# Patient Record
Sex: MALE | Race: BLACK OR AFRICAN AMERICAN | NOT HISPANIC OR LATINO
[De-identification: names, ages, dates, MRNs, and addresses within clinical notes are randomized per-mention and may not be internally consistent; named-entity substitution may affect disease eponyms.]

---
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
## 2019-09-10 ENCOUNTER — EMERGENCY (EMERGENCY)
Facility: HOSPITAL | Age: 19
LOS: 0 days | Discharge: HOME | End: 2019-09-10
Attending: EMERGENCY MEDICINE | Admitting: EMERGENCY MEDICINE
Payer: MEDICARE

## 2019-09-10 VITALS
SYSTOLIC BLOOD PRESSURE: 127 MMHG | DIASTOLIC BLOOD PRESSURE: 62 MMHG | RESPIRATION RATE: 18 BRPM | OXYGEN SATURATION: 98 % | TEMPERATURE: 98 F | HEART RATE: 77 BPM

## 2019-09-10 VITALS — TEMPERATURE: 98 F

## 2019-09-10 DIAGNOSIS — S09.90XA UNSPECIFIED INJURY OF HEAD, INITIAL ENCOUNTER: ICD-10-CM

## 2019-09-10 DIAGNOSIS — M54.2 CERVICALGIA: ICD-10-CM
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
## 2019-09-10 DIAGNOSIS — M25.552 PAIN IN LEFT HIP: ICD-10-CM
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
## 2019-09-10 PROCEDURE — 72125 CT NECK SPINE W/O DYE: CPT | Mod: 26

## 2019-09-10 PROCEDURE — 76604 US EXAM CHEST: CPT | Mod: 26

## 2019-09-10 PROCEDURE — 76705 ECHO EXAM OF ABDOMEN: CPT | Mod: 26

## 2019-09-10 PROCEDURE — 93308 TTE F-UP OR LMTD: CPT | Mod: 26

## 2019-09-10 PROCEDURE — 99284 EMERGENCY DEPT VISIT MOD MDM: CPT | Mod: 25

## 2019-09-10 NOTE — ED PEDIATRIC NURSE NOTE - NSIMPLEMENTINTERV_GEN_ALL_ED
Implemented All Universal Safety Interventions:  [place] to call system. Call bell, personal items and telephone within reach. Instruct patient to call for assistance. Room bathroom lighting operational. Non-slip footwear when patient is off stretcher. Physically safe environment: no spills, clutter or unnecessary equipment. Stretcher in lowest position, wheels locked, appropriate side rails in place.

## 2019-09-10 NOTE — ED PROVIDER NOTE - NSFOLLOWUPINSTRUCTIONS_ED_ALL_ED_FT
WHAT YOU NEED TO KNOW:    What do I need to know about neck pain? You may have sudden neck pain that increases quickly. You may instead feel pain build slowly over time. Neck pain may go away in a few days or weeks, or it may continue for months. The pain may come and go, or be worse with certain movements. The pain may be only in your neck, or it may move to your arms, back, or shoulders. You may also have pain that starts in another body area and moves to your neck. Some types of neck pain are permanent.Vertebral Column         What causes or increases my risk for neck pain?     Stenosis (narrowing) of your spinal column, or degeneration (breakdown) or inflammation of the discs in your neck      Inflammation from a condition such as rheumatoid arthritis, polymyalgia rheumatica, or rotator cuff tendinitis      A condition that affects neck to arm nerves, such as thoracic outlet syndrome or brachial neuritis       Trauma or injury to your neck, such as being hit from behind in a car (whiplash) or sleeping in a bad position      A fracture of a neck bone that causes nerve damage      A medical condition, such as shingles, meningitis, a tumor, or coronary artery disease (CAD)     How is the cause of neck pain diagnosed? Your healthcare provider will ask about your symptoms and when they began. Tell him if you were recently in an accident or had another injury to your neck. He will examine your neck and shoulders. He may also have you move your head and arms in certain ways to see if any position causes or relieves the pain.     Blood tests may be used to measure the amount of inflammation or to check for signs of an infection.      X-ray or MRI pictures may show a neck injury or medical condition. Do not enter the MRI room with anything metal. Metal can cause serious damage. Tell the healthcare provider if you have any metal in or on your body.    How is neck pain treated? Treatment will depend on what is causing your pain.    Medicines may be prescribed or recommended by your healthcare provider for pain. You may need medicine to treat nerve pain or to stop muscle spasms. Medicines may also be given to reduce inflammation. Your healthcare provider may inject medicine into a nerve to block pain. Over-the-counter NSAID medicine or acetaminophen may be recommended to help treat minor pain or inflammation.      Traction is used to relieve pressure from nerves. Your head is gently pulled up and away from your neck. This stretches muscles and ligaments and makes more room for the spine. Your healthcare provider will tell you the kind of traction that will help your neck pain. Do not use traction devices at home unless directed by your healthcare provider.      Surgery may be needed if the pain is severe or other treatments do not work. Surgery will not help every kind of neck pain. You may need surgery to stabilize a fractured bone or to remove a tumor. Surgery may also be used to widen a narrowed spinal column or to remove a disc from between neck bones.     What can I do to manage or prevent neck pain?     Rest your neck as directed. Do not make sudden movements, such as turning your head quickly. Your healthcare provider may recommend you wear a cervical collar for a short time. The collar will prevent you from moving your head. This will give your neck time to heal if an injury is causing your neck pain. Ask your healthcare provider when you can return to sports or other normal daily activities.      Apply heat as directed. Heat helps relieve pain and swelling. Use a heat wrap, or soak a small towel in warm water. Wring out the extra water. Apply the heat wrap or towel for 20 minutes every hour, or as directed.      Apply ice as directed. Ice helps relieve pain and swelling, and can help prevent tissue damage. Use an ice pack, or put ice in a bag. Cover the ice pack or back with a towel before you apply it to your neck. Apply the ice pack or ice for 15 minutes every hour, or as directed. Your healthcare provider can tell you how often to apply ice.      Do neck exercises as directed. Neck exercises help strengthen the muscles and increase range of motion. Your healthcare provider will tell you which exercises are right for you. He may give you instructions, or he may recommend that you work with a physical therapist. Your healthcare provider or therapist can make sure you are doing the exercises correctly.       Maintain good posture. Try to keep your head and shoulders lifted when you sit. If you work in front of a computer, make sure the monitor is at the right level. You should not need to look up down to see the screen. You should also not have to lean forward to be able to read what is on the screen. Make sure your keyboard, mouse, and other computer items are placed where you do not have to extend your shoulder to reach them. Get up often if you work in front of a computer or sit for long periods of time. Stretch or walk around to keep your neck muscles loose.    When should I seek immediate care?     You have an injury that causes neck pain and shooting pain down your arms or legs.      Your neck pain suddenly becomes severe.      You have neck pain along with numbness, tingling, or weakness in your arms or legs.      You have a stiff neck, a headache, and a fever.    When should I contact my healthcare provider?     You have new or worsening symptoms.      Your symptoms continue even after treatment.      You have questions or concerns about your condition or care.    CARE AGREEMENT:    You have the right to help plan your care. Learn about your health condition and how it may be treated. Discuss treatment options with your healthcare providers to decide what care you want to receive. You always have the right to refuse treatment.

## 2019-09-10 NOTE — ED PROVIDER NOTE - CLINICAL SUMMARY MEDICAL DECISION MAKING FREE TEXT BOX
Pt s/o head injury wearing helmet w/o HA or neruo sx with neck pain s/p a compression injury. pt evaluated c/w ATLS; CT negative and dc home

## 2019-09-10 NOTE — ED PROCEDURE NOTE - ATTENDING CONTRIBUTION TO CARE
I was present for and supervised the key critical aspects of the procedures performed during the care of the patient.  This ends my involvement in the patients care.
I was present for and supervised the key critical aspects of the procedures performed during the care of the patient.   This ends my involvement in the patients care.

## 2019-09-10 NOTE — ED PROVIDER NOTE - OBJECTIVE STATEMENT
18 yo M with no significant PMHx here after colliding with another football player with his head PTA with neck pain - mild. No neuro sx or deficits. No HA, n/v, dizziness. P

## 2019-09-10 NOTE — ED PROVIDER NOTE - ATTENDING CONTRIBUTION TO CARE
18 yo M with no significant PMHx here after colliding with another football player with his head PTA with neck pain - mild. No neuro sx or deficits. No HA, n/v, dizziness. Pt with mild pain in the L hip as well.   On exam pt is awake and alert, no distress, NCAT, CN intact, + mild line cervical TTP, no step offs, 5/5 strength throughout and no FND, no other spinal tenderness, abdominal/thorax and back exams nml. UEs, RLE  w/o tenderness or deformity and FROM without pain, LLE with mild pain with ranging hip only, otherwise nml.  CT c spine and XR hip and reval 18 yo M with no significant PMHx here after colliding with another football player with his head PTA with neck pain - mild. No neuro sx or deficits. No HA, n/v, dizziness. Pt with mild pain in the L hip as well.   On exam pt is awake and alert, no distress, NCAT, CN intact, + mild line cervical TTP, no step offs, 5/5 strength throughout and no FND, no other spinal tenderness, abdominal/thorax and back exams nml. UEs, RLE  w/o tenderness or deformity and FROM without pain, LLE with mild pain with ranging hip only, otherwise nml.  CT c spine and XR hip and reval    *it is noted that my exam differs from the resident and the reasoning for the CT scan

## 2019-09-10 NOTE — ED PEDIATRIC NURSE NOTE - OBJECTIVE STATEMENT
Patient was playing football and ran head on into another patient, was wearing helmet at time of collision, denies LOC, complains of neck pain

## 2019-09-10 NOTE — ED PEDIATRIC TRIAGE NOTE - CHIEF COMPLAINT QUOTE
Per patient, patient was playing football and ran head first into another player, denies LOC, was wearing helmet at time of collision, complains of neck pain

## 2019-09-10 NOTE — ED PROVIDER NOTE - PATIENT PORTAL LINK FT
You can access the FollowMyHealth Patient Portal offered by [facility] by registering at the following website: [email or web address]. By joining [employer]’s FollowMyHealth portal, you will also be able to view your health information using other applications (apps) compatible with our system.

## 2019-09-10 NOTE — ED PROVIDER NOTE - NS ED ROS FT
Constitutional: See HPI.  Eyes: No visual changes, eye pain or discharge. No Photophobia  ENMT: No hearing changes, pain, discharge or infections. No limited ROM  Cardiac: No SOB or edema. No chest pain with exertion.  Respiratory: No cough or respiratory distress. No hemoptysis.   GI: No nausea, vomiting, diarrhea or abdominal pain.  : No dysuria, frequency or burning. No Discharge  MS: No myalgia, muscle weakness, joint pain or back pain.  Neuro: No headache or weakness. No LOC.  Skin: No skin rash.  Except as documented in the HPI, all other systems are negative.

## 2020-10-01 ENCOUNTER — INPATIENT (INPATIENT)
Facility: HOSPITAL | Age: 20
LOS: 1 days | Discharge: HOME | End: 2020-10-03
Attending: HOSPITALIST | Admitting: HOSPITALIST
Payer: COMMERCIAL

## 2020-10-01 VITALS
SYSTOLIC BLOOD PRESSURE: 131 MMHG | RESPIRATION RATE: 10 BRPM | HEIGHT: 76 IN | DIASTOLIC BLOOD PRESSURE: 63 MMHG | TEMPERATURE: 99 F | OXYGEN SATURATION: 98 % | HEART RATE: 69 BPM

## 2020-10-01 LAB
ALBUMIN SERPL ELPH-MCNC: 4.2 G/DL — SIGNIFICANT CHANGE UP (ref 3.5–5.2)
ALBUMIN SERPL ELPH-MCNC: 4.9 G/DL — SIGNIFICANT CHANGE UP (ref 3.5–5.2)
ALBUMIN SERPL ELPH-MCNC: 5 G/DL — SIGNIFICANT CHANGE UP (ref 3.5–5.2)
ALP SERPL-CCNC: 52 U/L — SIGNIFICANT CHANGE UP (ref 30–115)
ALP SERPL-CCNC: 60 U/L — SIGNIFICANT CHANGE UP (ref 30–115)
ALP SERPL-CCNC: 61 U/L — SIGNIFICANT CHANGE UP (ref 30–115)
ALT FLD-CCNC: 156 U/L — HIGH (ref 0–41)
ALT FLD-CCNC: 191 U/L — HIGH (ref 0–41)
ALT FLD-CCNC: 201 U/L — HIGH (ref 0–41)
ANION GAP SERPL CALC-SCNC: 11 MMOL/L — SIGNIFICANT CHANGE UP (ref 7–14)
ANION GAP SERPL CALC-SCNC: 13 MMOL/L — SIGNIFICANT CHANGE UP (ref 7–14)
ANION GAP SERPL CALC-SCNC: 15 MMOL/L — HIGH (ref 7–14)
APAP SERPL-MCNC: <5 UG/ML — LOW (ref 10–30)
APPEARANCE UR: CLEAR — SIGNIFICANT CHANGE UP
APTT BLD: 31.1 SEC — SIGNIFICANT CHANGE UP (ref 27–39.2)
AST SERPL-CCNC: 355 U/L — HIGH (ref 0–41)
AST SERPL-CCNC: 482 U/L — HIGH (ref 0–41)
AST SERPL-CCNC: 530 U/L — HIGH (ref 0–41)
BACTERIA # UR AUTO: NEGATIVE — SIGNIFICANT CHANGE UP
BASOPHILS # BLD AUTO: 0.04 K/UL — SIGNIFICANT CHANGE UP (ref 0–0.2)
BASOPHILS NFR BLD AUTO: 0.4 % — SIGNIFICANT CHANGE UP (ref 0–1)
BILIRUB DIRECT SERPL-MCNC: 0.2 MG/DL — SIGNIFICANT CHANGE UP (ref 0–0.2)
BILIRUB INDIRECT FLD-MCNC: 0.5 MG/DL — SIGNIFICANT CHANGE UP (ref 0.2–1.2)
BILIRUB SERPL-MCNC: 0.7 MG/DL — SIGNIFICANT CHANGE UP (ref 0.2–1.2)
BILIRUB SERPL-MCNC: 0.8 MG/DL — SIGNIFICANT CHANGE UP (ref 0.2–1.2)
BILIRUB SERPL-MCNC: 0.9 MG/DL — SIGNIFICANT CHANGE UP (ref 0.2–1.2)
BILIRUB UR-MCNC: NEGATIVE — SIGNIFICANT CHANGE UP
BUN SERPL-MCNC: 12 MG/DL — SIGNIFICANT CHANGE UP (ref 10–20)
BUN SERPL-MCNC: 14 MG/DL — SIGNIFICANT CHANGE UP (ref 10–20)
BUN SERPL-MCNC: 15 MG/DL — SIGNIFICANT CHANGE UP (ref 10–20)
CALCIUM SERPL-MCNC: 10.4 MG/DL — HIGH (ref 8.5–10.1)
CALCIUM SERPL-MCNC: 10.6 MG/DL — HIGH (ref 8.5–10.1)
CALCIUM SERPL-MCNC: 9.5 MG/DL — SIGNIFICANT CHANGE UP (ref 8.5–10.1)
CHLORIDE SERPL-SCNC: 100 MMOL/L — SIGNIFICANT CHANGE UP (ref 98–110)
CHLORIDE SERPL-SCNC: 102 MMOL/L — SIGNIFICANT CHANGE UP (ref 98–110)
CHLORIDE SERPL-SCNC: 104 MMOL/L — SIGNIFICANT CHANGE UP (ref 98–110)
CK SERPL-CCNC: HIGH U/L (ref 0–225)
CO2 SERPL-SCNC: 21 MMOL/L — SIGNIFICANT CHANGE UP (ref 17–32)
CO2 SERPL-SCNC: 25 MMOL/L — SIGNIFICANT CHANGE UP (ref 17–32)
CO2 SERPL-SCNC: 25 MMOL/L — SIGNIFICANT CHANGE UP (ref 17–32)
COLOR SPEC: SIGNIFICANT CHANGE UP
CREAT SERPL-MCNC: 1.3 MG/DL — SIGNIFICANT CHANGE UP (ref 0.7–1.5)
CREAT SERPL-MCNC: 1.3 MG/DL — SIGNIFICANT CHANGE UP (ref 0.7–1.5)
CREAT SERPL-MCNC: 1.5 MG/DL — SIGNIFICANT CHANGE UP (ref 0.7–1.5)
DIFF PNL FLD: SIGNIFICANT CHANGE UP
EOSINOPHIL # BLD AUTO: 0.06 K/UL — SIGNIFICANT CHANGE UP (ref 0–0.7)
EOSINOPHIL NFR BLD AUTO: 0.6 % — SIGNIFICANT CHANGE UP (ref 0–8)
EPI CELLS # UR: 1 /HPF — SIGNIFICANT CHANGE UP (ref 0–5)
ETHANOL SERPL-MCNC: <10 MG/DL — SIGNIFICANT CHANGE UP
GLUCOSE SERPL-MCNC: 83 MG/DL — SIGNIFICANT CHANGE UP (ref 70–99)
GLUCOSE SERPL-MCNC: 86 MG/DL — SIGNIFICANT CHANGE UP (ref 70–99)
GLUCOSE SERPL-MCNC: 90 MG/DL — SIGNIFICANT CHANGE UP (ref 70–99)
GLUCOSE UR QL: NEGATIVE — SIGNIFICANT CHANGE UP
HCT VFR BLD CALC: 44.2 % — SIGNIFICANT CHANGE UP (ref 42–52)
HGB BLD-MCNC: 14.5 G/DL — SIGNIFICANT CHANGE UP (ref 14–18)
HYALINE CASTS # UR AUTO: 1 /LPF — SIGNIFICANT CHANGE UP (ref 0–7)
IMM GRANULOCYTES NFR BLD AUTO: 0.5 % — HIGH (ref 0.1–0.3)
INR BLD: 1.05 RATIO — SIGNIFICANT CHANGE UP (ref 0.65–1.3)
KETONES UR-MCNC: SIGNIFICANT CHANGE UP
LEUKOCYTE ESTERASE UR-ACNC: NEGATIVE — SIGNIFICANT CHANGE UP
LYMPHOCYTES # BLD AUTO: 1.86 K/UL — SIGNIFICANT CHANGE UP (ref 1.2–3.4)
LYMPHOCYTES # BLD AUTO: 17.7 % — LOW (ref 20.5–51.1)
MCHC RBC-ENTMCNC: 28.9 PG — SIGNIFICANT CHANGE UP (ref 27–31)
MCHC RBC-ENTMCNC: 32.8 G/DL — SIGNIFICANT CHANGE UP (ref 32–37)
MCV RBC AUTO: 88.2 FL — SIGNIFICANT CHANGE UP (ref 80–94)
MONOCYTES # BLD AUTO: 1.07 K/UL — HIGH (ref 0.1–0.6)
MONOCYTES NFR BLD AUTO: 10.2 % — HIGH (ref 1.7–9.3)
NEUTROPHILS # BLD AUTO: 7.41 K/UL — HIGH (ref 1.4–6.5)
NEUTROPHILS NFR BLD AUTO: 70.6 % — SIGNIFICANT CHANGE UP (ref 42.2–75.2)
NITRITE UR-MCNC: NEGATIVE — SIGNIFICANT CHANGE UP
NRBC # BLD: 0 /100 WBCS — SIGNIFICANT CHANGE UP (ref 0–0)
PH UR: 6.5 — SIGNIFICANT CHANGE UP (ref 5–8)
PLATELET # BLD AUTO: 312 K/UL — SIGNIFICANT CHANGE UP (ref 130–400)
POTASSIUM SERPL-MCNC: 4.3 MMOL/L — SIGNIFICANT CHANGE UP (ref 3.5–5)
POTASSIUM SERPL-MCNC: 4.5 MMOL/L — SIGNIFICANT CHANGE UP (ref 3.5–5)
POTASSIUM SERPL-MCNC: 4.9 MMOL/L — SIGNIFICANT CHANGE UP (ref 3.5–5)
POTASSIUM SERPL-SCNC: 4.3 MMOL/L — SIGNIFICANT CHANGE UP (ref 3.5–5)
POTASSIUM SERPL-SCNC: 4.5 MMOL/L — SIGNIFICANT CHANGE UP (ref 3.5–5)
POTASSIUM SERPL-SCNC: 4.9 MMOL/L — SIGNIFICANT CHANGE UP (ref 3.5–5)
PROT SERPL-MCNC: 6.2 G/DL — SIGNIFICANT CHANGE UP (ref 6–8)
PROT SERPL-MCNC: 7.6 G/DL — SIGNIFICANT CHANGE UP (ref 6–8)
PROT SERPL-MCNC: 7.8 G/DL — SIGNIFICANT CHANGE UP (ref 6–8)
PROT UR-MCNC: ABNORMAL
PROTHROM AB SERPL-ACNC: 12.1 SEC — SIGNIFICANT CHANGE UP (ref 9.95–12.87)
RBC # BLD: 5.01 M/UL — SIGNIFICANT CHANGE UP (ref 4.7–6.1)
RBC # FLD: 12.9 % — SIGNIFICANT CHANGE UP (ref 11.5–14.5)
RBC CASTS # UR COMP ASSIST: 0 /HPF — SIGNIFICANT CHANGE UP (ref 0–4)
SALICYLATES SERPL-MCNC: <0.3 MG/DL — LOW (ref 4–30)
SODIUM SERPL-SCNC: 136 MMOL/L — SIGNIFICANT CHANGE UP (ref 135–146)
SODIUM SERPL-SCNC: 140 MMOL/L — SIGNIFICANT CHANGE UP (ref 135–146)
SODIUM SERPL-SCNC: 140 MMOL/L — SIGNIFICANT CHANGE UP (ref 135–146)
SP GR SPEC: 1.02 — SIGNIFICANT CHANGE UP (ref 1.01–1.03)
UROBILINOGEN FLD QL: SIGNIFICANT CHANGE UP
WBC # BLD: 10.49 K/UL — SIGNIFICANT CHANGE UP (ref 4.8–10.8)
WBC # FLD AUTO: 10.49 K/UL — SIGNIFICANT CHANGE UP (ref 4.8–10.8)
WBC UR QL: 4 /HPF — SIGNIFICANT CHANGE UP (ref 0–5)

## 2020-10-01 PROCEDURE — 99223 1ST HOSP IP/OBS HIGH 75: CPT | Mod: AI

## 2020-10-01 PROCEDURE — 93010 ELECTROCARDIOGRAM REPORT: CPT

## 2020-10-01 PROCEDURE — 99285 EMERGENCY DEPT VISIT HI MDM: CPT

## 2020-10-01 RX ORDER — SODIUM CHLORIDE 9 MG/ML
1000 INJECTION INTRAMUSCULAR; INTRAVENOUS; SUBCUTANEOUS
Refills: 0 | Status: DISCONTINUED | OUTPATIENT
Start: 2020-10-01 | End: 2020-10-03

## 2020-10-01 RX ORDER — SODIUM CHLORIDE 9 MG/ML
3000 INJECTION, SOLUTION INTRAVENOUS ONCE
Refills: 0 | Status: COMPLETED | OUTPATIENT
Start: 2020-10-01 | End: 2020-10-01

## 2020-10-01 RX ORDER — SODIUM CHLORIDE 9 MG/ML
2000 INJECTION, SOLUTION INTRAVENOUS ONCE
Refills: 0 | Status: COMPLETED | OUTPATIENT
Start: 2020-10-01 | End: 2020-10-01

## 2020-10-01 RX ADMIN — SODIUM CHLORIDE 500 MILLILITER(S): 9 INJECTION INTRAMUSCULAR; INTRAVENOUS; SUBCUTANEOUS at 17:44

## 2020-10-01 RX ADMIN — SODIUM CHLORIDE 2000 MILLILITER(S): 9 INJECTION, SOLUTION INTRAVENOUS at 11:46

## 2020-10-01 RX ADMIN — SODIUM CHLORIDE 500 MILLILITER(S): 9 INJECTION INTRAMUSCULAR; INTRAVENOUS; SUBCUTANEOUS at 21:50

## 2020-10-01 RX ADMIN — SODIUM CHLORIDE 3000 MILLILITER(S): 9 INJECTION, SOLUTION INTRAVENOUS at 14:48

## 2020-10-01 NOTE — H&P ADULT - NSHPLABSRESULTS_GEN_ALL_CORE
CBC Full  -  ( 01 Oct 2020 11:55 )  WBC Count : 10.49 K/uL  RBC Count : 5.01 M/uL  Hemoglobin : 14.5 g/dL  Hematocrit : 44.2 %  Platelet Count - Automated : 312 K/uL  Mean Cell Volume : 88.2 fL  Mean Cell Hemoglobin : 28.9 pg  Mean Cell Hemoglobin Concentration : 32.8 g/dL  Auto Neutrophil # : 7.41 K/uL  Auto Lymphocyte # : 1.86 K/uL  Auto Monocyte # : 1.07 K/uL  Auto Eosinophil # : 0.06 K/uL  Auto Basophil # : 0.04 K/uL  Auto Neutrophil % : 70.6 %  Auto Lymphocyte % : 17.7 %  Auto Monocyte % : 10.2 %  Auto Eosinophil % : 0.6 %  Auto Basophil % : 0.4 %    10-01    140  |  102  |  14  ----------------------------<  90  4.3   |  25  |  1.3    Ca    10.4<H>      01 Oct 2020 14:30    TPro  7.6  /  Alb  4.9  /  TBili  0.8  /  DBili  x   /  AST  482<H>  /  ALT  191<H>  /  AlkPhos  60  10-01

## 2020-10-01 NOTE — ED PROVIDER NOTE - CLINICAL SUMMARY MEDICAL DECISION MAKING FREE TEXT BOX
20 yr old m who presents today with back and leg cramping. Pt informed us that he started taking C4 in the past two weeks before exercising. Pt noted to have a CK >87636 and elevated LFTs. IVF given. Pt advised to stop taking C4. Will admit to medicine for rhabdo

## 2020-10-01 NOTE — H&P ADULT - ASSESSMENT
Mr. Morris is a 20 year old athletically built M with a non-contributory PMHx presents to the ED with a 1 day history of back and bilateral shoulder discomfort.     #Rhabdomyolysis likely 2/2 to strenuous exercise   Patient vitally stable in the ED  CBC unremarkable   CMP demonstrated  SCr of 1.5 (unknown SCr prior to admission)   LFTs AST//201-->Downtrending to 482/191.    Initial CK is 42K   Patient reports no hematuria   Will order Lora, UCr to further stratify extent of possible renal injury. Will also order urine myoglobin as well   Repeat CK at 8pm, 1130pm and AM   f/u Hepatic Panel at 8pm to monitor LFTS  Management: Aggressive IV Hydration-->NS IV 500cc/hour to target 300cc/hour of urinary output until CK <1000   Monitor for possible acute renal failure (f/u BMP), and compartment syndrome-->patient not in severe pain nor are the extremities tense to touch.        DVT ppx: Not indicated-->Patient is ambulatory   GI: Not indicated  ACtivity: AAT  DIet: regular  Full COde  Dispo: Acute

## 2020-10-01 NOTE — ED ADULT TRIAGE NOTE - CHIEF COMPLAINT QUOTE
Patient states was at football practice when his body started to cramp up. States he did not eat breakfast today. Denies dizziness.

## 2020-10-01 NOTE — ED ADULT NURSE NOTE - HIV OFFER
How Severe Is Your Acne?: moderate Is This A New Presentation, Or A Follow-Up?: Follow Up Acne Opt out

## 2020-10-01 NOTE — ED ADULT NURSE NOTE - IS THE PATIENT ABLE TO BE SCREENED?
If you are a smoker, it is important for your health to stop smoking. Please be aware that second hand smoke is also harmful.
Yes

## 2020-10-01 NOTE — ED PROVIDER NOTE - OBJECTIVE STATEMENT
Patient is a 19 yo M w/ no pmh p/w cramps. Patient is a college football player; during practice today, he developed cramping of his back muscles and then his calves causing him to fall to his knees. Patient denies chest pain, sob, abdominal pain, N/V/D, head injury. Patient ambulatory. Denies fevers. Denies family hx of sudden death. Denies drug use.

## 2020-10-01 NOTE — ED PROVIDER NOTE - ATTENDING CONTRIBUTION TO CARE
20 yr old m w/ no pmh who presents with cramping. Pt was at training (football) when he developed back and lower extremity cramping. Pt states that he did not eat his usual breakfast. Pt denies any nausea, vomiting, chest pain, SOB, fevers chills or any other complaints.   Pt also denies any family history of sudden cardiac death.     Review of Systems    Constitutional: (-) fever  Cardiovascular: (-) chest pain, (-) syncope  Respiratory: (-) cough, (-) shortness of breath  Gastrointestinal: (-) vomiting, (-) diarrhea, (-) abdominal pain  Musculoskeletal: (-) neck pain, (-) back pain, (-) joint pain  Integumentary: (-) rash, (-) edema  Neurological: (-) headache, (-) altered mental status    Except as documented in the HPI, all other systems are negative.    VITAL SIGNS: I have reviewed nursing notes and confirm.  CONSTITUTIONAL: non-toxic, well appearing  SKIN: no rash, no petechiae.  EYES: PERRL, EOMI, pink conjunctiva, anicteric  ENT: tongue midline, no exudates, MMM  NECK: Supple; no meningismus, no JVD  CARD: RRR, no murmurs, equal radial pulses bilaterally 2+  RESP: CTAB, no respiratory distress  ABD: Soft, non-tender, non-distended, no peritoneal signs, no HSM, no CVA tenderness  EXT: Normal ROM x4. No edema. No calves tenderness  NEURO: Alert, oriented. CN2-12 intact, equal strength bilaterally, nl gait.  PSYCH: Cooperative, appropriate.    a/p  20 yr old m that presents with cramping like pain, during training, concern for dehydration vs rhabdo  -labs  -IVF  -UA  -dispo as per above

## 2020-10-01 NOTE — ED PROVIDER NOTE - PHYSICAL EXAMINATION
CONSTITUTIONAL: Well-developed; well-nourished; in no acute distress.   SKIN: warm, dry.  HEAD: Normocephalic; atraumatic.  EYES: PERRL, EOMI, no conjunctival erythema.  ENT: No nasal discharge; airway clear.  NECK: Supple; non tender.  CARD: S1, S2 normal; no murmurs, gallops, or rubs. Regular rate and rhythm.   RESP: No wheezes, rales or rhonchi.  ABD: soft ntnd.  EXT: Normal ROM.  No clubbing, cyanosis or edema.   NEURO: Alert, oriented, grossly unremarkable.  PSYCH: Cooperative, appropriate.

## 2020-10-01 NOTE — H&P ADULT - NSHPPHYSICALEXAM_GEN_ALL_CORE
Subjective   Patient ID: Krzysztof is a 36 year old male.    Chief Complaint   Patient presents with   • Foot Pain     LEFT FOOT PAIN STARTED 10 DAYS AGO. DENIES ANY INJURY. PAIN 10/10.      Patient is a 36-year-old male presenting to [facility] today with complaints of painful great toe on the left foot.  Patient states that he noticed the symptoms approximately 10 days ago.  The joint of the great toe is red, swollen and warm.  Pain is rated a 10 out of 10 when patient places weight on it.  At rest the pain is mild rated 3 or 4 out of 10.  Denies any numbness or tingling.  Denies pain elsewhere in the foot.  Denies any changes to dietary pattern including the use of alcohol or red meats.  Patient has normal range of motion and strength in the toe however has discomfort and pain with that.  Denies any trauma or injuries to the area.  Denies dropping anything on his foot.  No history of gout.  Denies any other complaints at this time.        Past Medical History:   Diagnosis Date   • ADHD    • Appendicitis    • Cholesteatoma of both ears    • History of abdominal pain    • History of obesity    • RAD (reactive airway disease)        MEDICATIONS:  Current Outpatient Medications   Medication Sig   • buPROPion (WELLBUTRIN XL) 150 MG 24 hr tablet Take 150 mg by mouth every morning.   • diclofenac (VOLTAREN) 1 % gel APPLY 2 GRAMS TO AFFECTED AREA 3 TO 4 TIMES DAILY AS NEEDED DO NOT EXCEED 32 GMS IN 24 HOURS   • naproxen (NAPROSYN) 500 MG tablet PLEASE SEE ATTACHED FOR DETAILED DIRECTIONS   • omeprazole (PRILOSEC) 20 MG capsule Take 20 mg by mouth daily.   • albuterol (VENTOLIN) (2.5 MG/3ML) 0.083% nebulizer solution Take 3 mLs by nebulization every 6 hours as needed for Wheezing.   • Azelastine HCl 137 MCG/SPRAY Solution INHALE 1 SPRAY INTO EACH NOSTRIL TWICE DAILY   • PROAIR  (90 Base) MCG/ACT inhaler INHALE 2 PUFFS BY MOUTH 4 TIMES A DAY AS NEEDED   • montelukast (SINGULAIR) 10 MG tablet TAKE 1 TABLET BY MOUTH AT BEDTIME    • lisdexamfetamine (VYVANSE) 70 MG capsule Take 1 capsule by mouth daily.   • fluticasone (FLONASE) 50 MCG/ACT nasal spray Spray 2 sprays in each nostril daily.   • albuterol (PROAIR HFA) 108 (90 Base) MCG/ACT inhaler INHALE 2 PUFFS BY MOUTH 4 TIMES A DAY AS NEEDED   • loratadine (CLARITIN) 10 MG tablet Take 10 mg by mouth daily.   • acetaminophen-codeine (TYLENOL NO.3) 300-30 MG per tablet    • venlafaxine XR (EFFEXOR XR) 150 MG 24 hr capsule Take 1 capsule by mouth daily.     No current facility-administered medications for this visit.        ALLERGIES:  ALLERGIES:   Allergen Reactions   • Peanut   (Food Or Med) THROAT SWELLING   • No Name Available Other (See Comments)     No known drug allergies   • Peanut   (Food Or Med) Other (See Comments)     unknown       PAST SURGICAL HISTORY:  Past Surgical History:   Procedure Laterality Date   • Appendectomy     • Astigmatism correcting function of intraocular lens     • Inner ear surgery     • Inner ear surgery     • Inner ear surgery proc unlisted     • Tympanoplasty         FAMILY HISTORY:  Family History   Problem Relation Age of Onset   • Diabetes Mother    • Myocardial Infarction Mother    • Kidney disease Mother         renal failure   • Diabetes Father    • Coronary Artery Disease Father        SOCIAL HISTORY:  Social History     Tobacco Use   • Smoking status: Never Smoker   • Smokeless tobacco: Never Used   Substance Use Topics   • Alcohol use: Not Currently   • Drug use: Not Currently         Patient's medications, allergies, past medical, surgical, and social history  were reviewed and updated as appropriate.    Review of Systems   Constitutional: Negative for chills and fever.   HENT: Negative for congestion and sore throat.    Respiratory: Negative for cough and shortness of breath.    Cardiovascular: Negative for chest pain.   Musculoskeletal: Positive for arthralgias and joint swelling.   Skin: Positive for color change.   Neurological: Negative for  dizziness and headaches.   All other systems reviewed and are negative.      Objective   Physical Exam  Vitals signs and nursing note reviewed.   Constitutional:       Appearance: Normal appearance.   HENT:      Head: Normocephalic.   Eyes:      Pupils: Pupils are equal, round, and reactive to light.   Cardiovascular:      Rate and Rhythm: Normal rate and regular rhythm.      Pulses: Normal pulses.      Heart sounds: Normal heart sounds.   Pulmonary:      Effort: Pulmonary effort is normal.      Breath sounds: Normal breath sounds.   Musculoskeletal: Normal range of motion.      Left foot: Bony tenderness and swelling present.      Comments: Redness, swelling and tenderness to MTP joint of great toe.   Skin:     General: Skin is warm and dry.      Capillary Refill: Capillary refill takes less than 2 seconds.   Neurological:      General: No focal deficit present.      Mental Status: He is alert.   Psychiatric:         Mood and Affect: Mood normal.       Visit Vitals  /80 (BP Location: LUE - Left upper extremity, Patient Position: Sitting, Cuff Size: Large Adult)   Pulse 93   Temp 98.1 °F (36.7 °C) (Temporal)   Resp 18   Ht 5' 9\" (1.753 m)   Wt 129.5 kg (285 lb 9.7 oz)   SpO2 97%   BMI 42.18 kg/m²       Assessment   Problem List Items Addressed This Visit     None      Visit Diagnoses     Acute gout involving toe of left foot, unspecified cause    -  Primary    Relevant Medications    methylPREDNISolone (MEDROL DOSEPAK) 4 MG tablet    Other Relevant Orders    ORTHOPEDIC INJURY TREATMENT    Left foot pain              This is a 36 year old year-old male who presents with acute gout attack.  Will treat with Medrol Dosepak.  Given postop shoe in office for comfort recommended using Tylenol for pain relief at home as patient only has 1 kidney.  Recommended using ice as well. Follow-up with PCP for further evaluation and treatment.    Instructions provided as documented in the AVS.    Thank you for visiting [facility].    PHYSICAL EXAM:  GENERAL: NAD, lying in bed comfortably  HEAD:  Atraumatic, Normocephalic  NECK: Supple, No JVD  CHEST/LUNG: Clear to auscultation bilaterally; No rales, rhonchi, wheezing, or rubs. Unlabored respirations  HEART: Regular rate and rhythm; No murmurs, rubs, or gallops  ABDOMEN: Bowel sounds present; Soft, Nontender, Nondistended.   EXTREMITIES:  2+ Peripheral Pulses, brisk capillary refill. No clubbing, cyanosis, or edema  NERVOUS SYSTEM:  Alert & Oriented X3, speech clear. No deficits   MSK: FROM all 4 extremities, full and equal strength  SKIN: No rashes or lesions

## 2020-10-01 NOTE — H&P ADULT - ATTENDING COMMENTS
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 Denies any fevers/chills, dysuria, hematuria, or N/V/D. In the ED, CK noted to be 42K with transaminitis. Started on IVFs (LR).     Physical exam shows athletically-built pt in NAD. VSS, afebrile, not hypoxic on RA. A&Ox3. Non-focal neuro exam. Muscle strength/sensation intact. CTA B/L with no W/C/R. RRR, no M/G/R. ABD is soft and non-tender, normoactive BSs. LEs without swelling. No rashes. Labs and radiology as above.     Generalized myalgias due to rhabdomyolysis. IVFs. Serial BMPs and CK level. Monitor Urinary output with a goal of 1 mL/kg/hr. PRN pain meds.     Transaminitis, from above. IVFs. Serial LFTs.     Restart home meds. DVT PPX. Inform PCP of pt's admission to hospital. My note supersedes the residents note.

## 2020-10-01 NOTE — ED ADULT NURSE NOTE - OBJECTIVE STATEMENT
Pt presents with generalized body cramps  worsen at bilateral lower extremities started while practicing football today . pt states he had breakfast (bagel), had a pre-workout drink this morning and drank a lot of fluid. pt denies this happening to him in the past. pt denies any issue with urination.

## 2020-10-01 NOTE — ED PROVIDER NOTE - NSCAREINITIATED _GEN_ER
Gabrielle Hong(Resident) Include Z78.9 (Other Specified Conditions Influencing Health Status) As An Associated Diagnosis?: No Consent: The patient's consent was obtained including but not limited to risks of crusting, scabbing, blistering, scarring, darker or lighter pigmentary change, recurrence, incomplete removal and infection. Detail Level: Detailed Post-Care Instructions: I reviewed with the patient in detail post-care instructions. Patient is to wear sunprotection, and avoid picking at any of the treated lesions. Pt may apply Vaseline to crusted or scabbing areas. Medical Necessity Information: It is in your best interest to select a reason for this procedure from the list below. All of these items fulfill various CMS LCD requirements except the new and changing color options. Medical Necessity Clause: This procedure was medically necessary because the lesions that were treated were:

## 2020-10-01 NOTE — ED PROVIDER NOTE - NS ED ROS FT
Constitutional: No fevers.   Eyes:  No visual changes, eye pain or discharge.  ENMT:  No sore throat or runny nose.  Cardiac:  No chest pain, SOB or edema.   Respiratory:  No cough or respiratory distress. No hemoptysis. No history of asthma or RAD.  GI:  No nausea, vomiting, diarrhea or abdominal pain.  :  No dysuria, frequency or burning.  MS:  muscle cramps.   Neuro:  No headache or weakness.  No LOC.  Skin:  No skin rash.   Endocrine: No history of thyroid disease or diabetes.

## 2020-10-01 NOTE — ED PROVIDER NOTE - PROGRESS NOTE DETAILS
Patient denies tylenol and excessive use. Patient endorses new use of C4 extreme pre-workout; he took a total of 4 scoops. Discussed with Dr. Hunter the transaminitis- toxicology- who believes it is 2/2 rhabdo but recommends discontinuing the C4 and trending of LFTs. -DC

## 2020-10-02 ENCOUNTER — TRANSCRIPTION ENCOUNTER (OUTPATIENT)
Age: 20
End: 2020-10-02

## 2020-10-02 LAB
ALBUMIN SERPL ELPH-MCNC: 4.1 G/DL — SIGNIFICANT CHANGE UP (ref 3.5–5.2)
ALP SERPL-CCNC: 55 U/L — SIGNIFICANT CHANGE UP (ref 30–115)
ALT FLD-CCNC: 134 U/L — HIGH (ref 0–41)
AMPHET UR-MCNC: NEGATIVE — SIGNIFICANT CHANGE UP
ANION GAP SERPL CALC-SCNC: 8 MMOL/L — SIGNIFICANT CHANGE UP (ref 7–14)
AST SERPL-CCNC: 235 U/L — HIGH (ref 0–41)
BARBITURATES UR SCN-MCNC: NEGATIVE — SIGNIFICANT CHANGE UP
BASOPHILS # BLD AUTO: 0.04 K/UL — SIGNIFICANT CHANGE UP (ref 0–0.2)
BASOPHILS NFR BLD AUTO: 0.6 % — SIGNIFICANT CHANGE UP (ref 0–1)
BENZODIAZ UR-MCNC: NEGATIVE — SIGNIFICANT CHANGE UP
BILIRUB SERPL-MCNC: 0.8 MG/DL — SIGNIFICANT CHANGE UP (ref 0.2–1.2)
BUN SERPL-MCNC: 11 MG/DL — SIGNIFICANT CHANGE UP (ref 10–20)
CALCIUM SERPL-MCNC: 9.4 MG/DL — SIGNIFICANT CHANGE UP (ref 8.5–10.1)
CHLORIDE SERPL-SCNC: 106 MMOL/L — SIGNIFICANT CHANGE UP (ref 98–110)
CK SERPL-CCNC: HIGH U/L (ref 0–225)
CO2 SERPL-SCNC: 26 MMOL/L — SIGNIFICANT CHANGE UP (ref 17–32)
COCAINE METAB.OTHER UR-MCNC: NEGATIVE — SIGNIFICANT CHANGE UP
CREAT ?TM UR-MCNC: 47 MG/DL — SIGNIFICANT CHANGE UP
CREAT SERPL-MCNC: 1.2 MG/DL — SIGNIFICANT CHANGE UP (ref 0.7–1.5)
EOSINOPHIL # BLD AUTO: 0.21 K/UL — SIGNIFICANT CHANGE UP (ref 0–0.7)
EOSINOPHIL NFR BLD AUTO: 3.4 % — SIGNIFICANT CHANGE UP (ref 0–8)
GLUCOSE SERPL-MCNC: 85 MG/DL — SIGNIFICANT CHANGE UP (ref 70–99)
HCT VFR BLD CALC: 40.5 % — LOW (ref 42–52)
HGB BLD-MCNC: 13.1 G/DL — LOW (ref 14–18)
IMM GRANULOCYTES NFR BLD AUTO: 0.3 % — SIGNIFICANT CHANGE UP (ref 0.1–0.3)
LYMPHOCYTES # BLD AUTO: 2.76 K/UL — SIGNIFICANT CHANGE UP (ref 1.2–3.4)
LYMPHOCYTES # BLD AUTO: 44.1 % — SIGNIFICANT CHANGE UP (ref 20.5–51.1)
MCHC RBC-ENTMCNC: 29.6 PG — SIGNIFICANT CHANGE UP (ref 27–31)
MCHC RBC-ENTMCNC: 32.3 G/DL — SIGNIFICANT CHANGE UP (ref 32–37)
MCV RBC AUTO: 91.4 FL — SIGNIFICANT CHANGE UP (ref 80–94)
METHADONE UR-MCNC: NEGATIVE — SIGNIFICANT CHANGE UP
MONOCYTES # BLD AUTO: 0.81 K/UL — HIGH (ref 0.1–0.6)
MONOCYTES NFR BLD AUTO: 12.9 % — HIGH (ref 1.7–9.3)
NEUTROPHILS # BLD AUTO: 2.42 K/UL — SIGNIFICANT CHANGE UP (ref 1.4–6.5)
NEUTROPHILS NFR BLD AUTO: 38.7 % — LOW (ref 42.2–75.2)
NRBC # BLD: 0 /100 WBCS — SIGNIFICANT CHANGE UP (ref 0–0)
OPIATES UR-MCNC: NEGATIVE — SIGNIFICANT CHANGE UP
PCP SPEC-MCNC: SIGNIFICANT CHANGE UP
PLATELET # BLD AUTO: 271 K/UL — SIGNIFICANT CHANGE UP (ref 130–400)
POTASSIUM SERPL-MCNC: 4.2 MMOL/L — SIGNIFICANT CHANGE UP (ref 3.5–5)
POTASSIUM SERPL-SCNC: 4.2 MMOL/L — SIGNIFICANT CHANGE UP (ref 3.5–5)
PROPOXYPHENE QUALITATIVE URINE RESULT: NEGATIVE — SIGNIFICANT CHANGE UP
PROT SERPL-MCNC: 6.2 G/DL — SIGNIFICANT CHANGE UP (ref 6–8)
RBC # BLD: 4.43 M/UL — LOW (ref 4.7–6.1)
RBC # FLD: 13.1 % — SIGNIFICANT CHANGE UP (ref 11.5–14.5)
SARS-COV-2 IGG SERPL QL IA: NEGATIVE — SIGNIFICANT CHANGE UP
SARS-COV-2 IGM SERPL IA-ACNC: 0.8 INDEX — SIGNIFICANT CHANGE UP
SARS-COV-2 RNA SPEC QL NAA+PROBE: SIGNIFICANT CHANGE UP
SODIUM SERPL-SCNC: 140 MMOL/L — SIGNIFICANT CHANGE UP (ref 135–146)
SODIUM UR-SCNC: 141 MMOL/L — SIGNIFICANT CHANGE UP
WBC # BLD: 6.26 K/UL — SIGNIFICANT CHANGE UP (ref 4.8–10.8)
WBC # FLD AUTO: 6.26 K/UL — SIGNIFICANT CHANGE UP (ref 4.8–10.8)

## 2020-10-02 PROCEDURE — 99233 SBSQ HOSP IP/OBS HIGH 50: CPT

## 2020-10-02 RX ADMIN — SODIUM CHLORIDE 250 MILLILITER(S): 9 INJECTION INTRAMUSCULAR; INTRAVENOUS; SUBCUTANEOUS at 10:49

## 2020-10-02 NOTE — DISCHARGE NOTE PROVIDER - HOSPITAL COURSE
20 year old man with no significant PMHx who presents to the ED with a 1 day history of back and bilateral shoulder discomfort in the setting of intense physical exercise, found to have rhabdomyolysis.    # Rhabdomyolysis likely 2/2 to strenuous exercise   - Cr was 1.5, downtrending to 1.1  - Initial CK is 42K --> 18k  - IV hydration was initiated at 500 cc/hr, now decreased to 250 cc/hr. C/w IV fluids until CK normalizes  - LFTs AST//201 --> Downtrending  - Monitor electrolytes, CK, and LFTs    DVT PPx: Not indicated  GI PPX: Not indicated  Diet: Regular  Activity: Increase as tolerated  Dispo: from home, no needs  Code Status: full code    Patient's symptoms were completely resolved and CK downtrending but still elevated. Continuing IV hydration until CK normalizes. 20 year old man with no significant PMHx who presents to the ED with a 1 day history of back and bilateral shoulder discomfort in the setting of intense physical exercise, found to have rhabdomyolysis.    # Rhabdomyolysis likely 2/2 to strenuous exercise   - Cr was 1.5, downtrending to 1.1  - Initial CK is 42K --> 18k  - IV hydration was initiated at 500 cc/hr, now decreased to 250 cc/hr. C/w IV fluids until CK normalizes  - LFTs AST//201 --> Downtrending  - Monitor electrolytes, CK, and LFTs    DVT PPx: Not indicated  GI PPX: Not indicated  Diet: Regular  Activity: Increase as tolerated  Dispo: from home, no needs  Code Status: full code    Patient's symptoms were completely resolved and CK downtrending but still elevated. Continuing IV hydration until CK normalizes.    Patient does not need any medications on discharge. 20 year old man with no significant PMHx who presents to the ED with a 1 day history of back and bilateral shoulder discomfort in the setting of intense physical exercise, found to have rhabdomyolysis. He was started on NS at 500cc/hr then decreased to 250cc/hr. Patient's symptoms were completely resolved and CK downtrending but still elevated. Continuing IV hydration until CK normalizes. Patient does not need any medications on discharge.

## 2020-10-02 NOTE — PROGRESS NOTE ADULT - ASSESSMENT
20 year old man with no significant PMHx who presents to the ED with a 1 day history of back and bilateral shoulder discomfort in the setting of intense physical exercise, found to have rhabdomyolysis.    # Rhabdomyolysis likely 2/2 to strenuous exercise   - Cr was 1.5, downtrending to 1.1  - Initial CK is 42K --> 12k  - IV hydration was initiated at 500 cc/hr, now decreased to 250 cc/hr  - LFTs AST//201 --> Downtrending  - Monitor electrolytes, CK, and LFTs    DVT PPx: Not indicated  GI PPX: Not indicated  Diet: Regular  Activity: Increase as tolerated  Dispo: from home, no needs  Code Status: full code 20 year old man with no significant PMHx who presents to the ED with a 1 day history of back and bilateral shoulder discomfort in the setting of intense physical exercise, found to have rhabdomyolysis.    # Rhabdomyolysis likely 2/2 to strenuous exercise   - Cr was 1.5, downtrending to 1.1  - Initial CK is 42K --> 18k  - IV hydration was initiated at 500 cc/hr, now decreased to 250 cc/hr  - LFTs AST//201 --> Downtrending  - Monitor electrolytes, CK, and LFTs    DVT PPx: Not indicated  GI PPX: Not indicated  Diet: Regular  Activity: Increase as tolerated  Dispo: from home, no needs  Code Status: full code

## 2020-10-02 NOTE — PROGRESS NOTE ADULT - ASSESSMENT
20 year old man with no significant PMHx who presents to the ED with a 1 day history of back and bilateral shoulder discomfort in the setting of intense physical exercise, found to have rhabdomyolysis.    # Rhabdomyolysis likely 2/2 to strenuous exercise   - Cr was 1.5, downtrending to 1.1  - Initial CK is 42K --> 18k  - IV hydration was initiated at 500 cc/hr, now decreased to 250 cc/hr  - LFTs AST//201 --> Downtrending  - Monitor electrolytes, CK, and LFTs    DVT PPx: Not indicated  GI PPX: Not indicated  Diet: Regular  Activity: Increase as tolerated  Dispo: from home, no needs  Code Status: full code    #Progress Note Handoff  Pending (specify):  Clinical/ biochemical improvement  'Family discussion: na  Disposition: Home

## 2020-10-02 NOTE — PROGRESS NOTE ADULT - SUBJECTIVE AND OBJECTIVE BOX
SUBJECTIVE:    Patient is a 20y old Male who presents with a chief complaint of Back cramps (01 Oct 2020 15:39). No complaints overnight. Cramping pain is resolved. Feeling well this morning.      HPI:  Mr. Morris is a 20 year old athletically built M with a non-contributory PMHx presents to the ED with a 1 day history of back and bilateral shoulder discomfort.  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  Patient endorses adequately hydrating himself and having breakfast at 7AM prior to his workout.   At around 8AM, patient had his usual workout with resistance bands, and this was for one hour.  At football practice an hour later, the patient was doing 50 yard sprints.  He completed three sprints before he endorsed having a "crampy" sensation in his upper back that radiated to his bilateral shoulders, which was intermittent in duration and resolved spontaneously.  The patient saw the team doctor regarding these symptoms, who recommended further hydration.  The patient subsequently developed similar discomfort and a crampy sensation on his hamstrings bilaterally. The team doctor then recommended the patient to come to the ED.  Patient denies chest pain, sob, flank pain bilaterally, lower extremity pain, n/v/d, fevers rigors, sick contacts, or recent recreational drug use.     In the ED: CBC was unremarkable. CMP demonstrated elevated LFTs (530/201 AST/ALT) and a CK of 42K.  Patient was given a 2L and then a 3L bolus of LR.  (01 Oct 2020 15:39)      Currently admitted to medicine with the primary diagnosis of Rhabdomyolysis    PAST MEDICAL & SURGICAL HISTORY  No pertinent past medical history    No significant past surgical history      SOCIAL HISTORY:    ALLERGIES:  No Known Allergies    MEDICATIONS:  STANDING MEDICATIONS  sodium chloride 0.9%. 1000 milliLiter(s) IV Continuous <Continuous>    PRN MEDICATIONS    VITALS:   Vital Signs Last 24 Hrs  T(C): 37.1 (02 Oct 2020 06:55), Max: 37.1 (01 Oct 2020 11:15)  T(F): 98.8 (02 Oct 2020 06:55), Max: 98.8 (02 Oct 2020 06:55)  HR: 72 (02 Oct 2020 06:55) (62 - 72)  BP: 120/57 (02 Oct 2020 06:55) (120/57 - 134/62)  BP(mean): --  RR: 18 (02 Oct 2020 06:55) (10 - 18)  SpO2: 100% (01 Oct 2020 21:31) (98% - 100%)    LABS:                        13.1   6.26  )-----------( 271      ( 02 Oct 2020 06:30 )             40.5     10-02    140  |  107  |  11  ----------------------------<  81  4.4   |  22  |  1.1    Ca    9.1      02 Oct 2020 06:30  Phos  3.8     10  Mg     1.8     10    TPro  5.8<L>  /  Alb  3.7  /  TBili  0.9  /  DBili  x   /  AST  292<H>  /  ALT  137<H>  /  AlkPhos  52  1002    PT/INR - ( 01 Oct 2020 14:30 )   PT: 12.10 sec;   INR: 1.05 ratio         PTT - ( 01 Oct 2020 14:30 )  PTT:31.1 sec  Urinalysis Basic - ( 01 Oct 2020 11:55 )    Color: Light Yellow / Appearance: Clear / S.017 / pH: x  Gluc: x / Ketone: Trace  / Bili: Negative / Urobili: <2 mg/dL   Blood: x / Protein: 30 mg/dL / Nitrite: Negative   Leuk Esterase: Negative / RBC: 0 /HPF / WBC 4 /HPF   Sq Epi: x / Non Sq Epi: 1 /HPF / Bacteria: Negative        Creatine Kinase, Serum: 71058 U/L <H> (10-02-20 @ 06:30)  Creatine Kinase, Serum: 28917 U/L <H> (10-01-20 @ 23:00)  Creatine Kinase, Serum: 08423 U/L <H> (10-01-20 @ 21:37)  Creatine Kinase, Serum: 60108 U/L <H> (10-01-20 @ 11:55)      CARDIAC MARKERS ( 02 Oct 2020 06:30 )  x     / x     / 25561 U/L / x     / x      CARDIAC MARKERS ( 01 Oct 2020 23:00 )  x     / x     / 72384 U/L / x     / x      CARDIAC MARKERS ( 01 Oct 2020 21:37 )  x     / x     / 08952 U/L / x     / x      CARDIAC MARKERS ( 01 Oct 2020 11:55 )  x     / x     / 18882 U/L / x     / x          RADIOLOGY:    PHYSICAL EXAM:  GEN: No acute distress  LUNGS: Clear to auscultation bilaterally   HEART: Regular  ABD: Soft, non-tender, non-distended.  EXT: NC/NC/NE/2+PP/BARRERA/Skin Intact.   NEURO: AAOX3

## 2020-10-02 NOTE — SBIRT NOTE ADULT - NSSBIRTALCPOSREINDET_GEN_A_CORE
LMSW reviewed results of assessment with patient. Reinforced positive, healthy choices around current alcohol use. Educated patient about NIAAA low-risk drinking level and risks related to excessive alcohol use.

## 2020-10-02 NOTE — DISCHARGE NOTE PROVIDER - CARE PROVIDER_API CALL
Forrest Palmer (DO)  Medicine  [facility]  [address] [place] [address]  Phone: [phone]  Fax: [phone]  Follow Up Time:

## 2020-10-02 NOTE — PROGRESS NOTE ADULT - SUBJECTIVE AND OBJECTIVE BOX
DARVIN CASTILLO  20y  Male      Patient is a 20y old  Male who presents with a chief complaint of Back cramps.      INTERVAL HPI/OVERNIGHT EVENTS: The patient was seen and examined at bedside.  Resting in bed. No complaints so far.       ******************************* REVIEW OF SYSTEMS:**********************************************    All other review of systems negative    *********************** VITALS ******************************************    T(F): 97.5 (10-02-20 @ 14:37)  HR: 80 (10-02-20 @ 14:37) (62 - 80)  BP: 134/63 (10-02-20 @ 14:37) (120/57 - 134/63)  RR: 18 (10-02-20 @ 14:37) (18 - 18)  SpO2: 97% (10-02-20 @ 14:37) (97% - 100%)    10-02-20 @ 07:  -  10-02-20 @ 15:05  --------------------------------------------------------  IN: 500 mL / OUT: 900 mL / NET: -400 mL            10-02-20 @ 07:01  -  10-02-20 @ 15:05  --------------------------------------------------------  IN: 500 mL / OUT: 900 mL / NET: -400 mL        ******************************** PHYSICAL EXAM:**************************************************  GENERAL: NAD    PSYCH: no agitation, baseline mentation  HEENT:     NERVOUS SYSTEM:  Alert & Oriented X3, MS  5/5 B/L  UE and LE ; Sensory intact    PULMONARY: JAXSON, CTA    CARDIOVASCULAR: S1S2 RRR    GI: Soft, NT, ND; BS present.    EXTREMITIES:  2+ Peripheral Pulses, No clubbing, cyanosis, or edema    LYMPH: No lymphadenopathy noted    SKIN: No rashes or lesions      **************************** LABS *******************************************************                          13.1   6.26  )-----------( 271      ( 02 Oct 2020 06:30 )             40.5     10-02    140  |  107  |  11  ----------------------------<  81  4.4   |  22  |  1.1    Ca    9.1      02 Oct 2020 06:30  Phos  3.8     10-02  Mg     1.8     10-02    TPro  5.8<L>  /  Alb  3.7  /  TBili  0.9  /  DBili  x   /  AST  292<H>  /  ALT  137<H>  /  AlkPhos  52  10-02      Urinalysis Basic - ( 01 Oct 2020 11:55 )    Color: Light Yellow / Appearance: Clear / S.017 / pH: x  Gluc: x / Ketone: Trace  / Bili: Negative / Urobili: <2 mg/dL   Blood: x / Protein: 30 mg/dL / Nitrite: Negative   Leuk Esterase: Negative / RBC: 0 /HPF / WBC 4 /HPF   Sq Epi: x / Non Sq Epi: 1 /HPF / Bacteria: Negative      PT/INR - ( 01 Oct 2020 14:30 )   PT: 12.10 sec;   INR: 1.05 ratio         PTT - ( 01 Oct 2020 14:30 )  PTT:31.1 sec  Lactate Trend    CARDIAC MARKERS ( 02 Oct 2020 06:30 )  x     / x     / 20401 U/L / x     / x      CARDIAC MARKERS ( 01 Oct 2020 23:00 )  x     / x     / 73842 U/L / x     / x      CARDIAC MARKERS ( 01 Oct 2020 21:37 )  x     / x     / 02567 U/L / x     / x      CARDIAC MARKERS ( 01 Oct 2020 11:55 )  x     / x     / 28102 U/L / x     / x          CAPILLARY BLOOD GLUCOSE              **************************Active Medications *******************************************  No Known Allergies      sodium chloride 0.9%. 1000 milliLiter(s) IV Continuous <Continuous>      ***************************************************  RADIOLOGY & ADDITIONAL TESTS:    Imaging Personally Reviewed:  [ ] YES  [ ] NO    HEALTH ISSUES - PROBLEM Dx:

## 2020-10-02 NOTE — DISCHARGE NOTE PROVIDER - NSDCCPCAREPLAN_GEN_ALL_CORE_FT
PRINCIPAL DISCHARGE DIAGNOSIS  Diagnosis: Rhabdomyolysis  Assessment and Plan of Treatment: You had muscle pain after vigorous exercise and you were found to have highly elevated levels of creatine kinase, indicating muscle breakdown (rhabdomyolysis). We gave you a large amount of IV fluids to keep you hydrated and reduce the amount of creatine kinase in your blood as it can cause organ damage if persistently elevated. Your CK levels normalized and your symptoms also improved. Please follow up with your primary doctor.      SECONDARY DISCHARGE DIAGNOSES  Diagnosis: Transaminitis  Assessment and Plan of Treatment: You had high levels of AST and ALT, which can indicate muscle breakdown. They decreased as we gave you IV fluids. Please follow up with your primary doctor.

## 2020-10-03 ENCOUNTER — TRANSCRIPTION ENCOUNTER (OUTPATIENT)
Age: 20
End: 2020-10-03

## 2020-10-03 VITALS
RESPIRATION RATE: 18 BRPM | SYSTOLIC BLOOD PRESSURE: 108 MMHG | DIASTOLIC BLOOD PRESSURE: 55 MMHG | TEMPERATURE: 97 F | HEART RATE: 66 BPM

## 2020-10-03 LAB
ALBUMIN SERPL ELPH-MCNC: 4 G/DL — SIGNIFICANT CHANGE UP (ref 3.5–5.2)
ALP SERPL-CCNC: 46 U/L — SIGNIFICANT CHANGE UP (ref 30–115)
ALT FLD-CCNC: 110 U/L — HIGH (ref 0–41)
ANION GAP SERPL CALC-SCNC: 8 MMOL/L — SIGNIFICANT CHANGE UP (ref 7–14)
AST SERPL-CCNC: 162 U/L — HIGH (ref 0–41)
BILIRUB SERPL-MCNC: 0.8 MG/DL — SIGNIFICANT CHANGE UP (ref 0.2–1.2)
BUN SERPL-MCNC: 8 MG/DL — LOW (ref 10–20)
CALCIUM SERPL-MCNC: 9.7 MG/DL — SIGNIFICANT CHANGE UP (ref 8.5–10.1)
CHLORIDE SERPL-SCNC: 105 MMOL/L — SIGNIFICANT CHANGE UP (ref 98–110)
CK SERPL-CCNC: 8263 U/L — HIGH (ref 0–225)
CO2 SERPL-SCNC: 26 MMOL/L — SIGNIFICANT CHANGE UP (ref 17–32)
CREAT SERPL-MCNC: 1.2 MG/DL — SIGNIFICANT CHANGE UP (ref 0.7–1.5)
GLUCOSE SERPL-MCNC: 90 MG/DL — SIGNIFICANT CHANGE UP (ref 70–99)
HCT VFR BLD CALC: 38.4 % — LOW (ref 42–52)
HGB BLD-MCNC: 12.3 G/DL — LOW (ref 14–18)
MAGNESIUM SERPL-MCNC: 1.8 MG/DL — SIGNIFICANT CHANGE UP (ref 1.8–2.4)
MCHC RBC-ENTMCNC: 29 PG — SIGNIFICANT CHANGE UP (ref 27–31)
MCHC RBC-ENTMCNC: 32 G/DL — SIGNIFICANT CHANGE UP (ref 32–37)
MCV RBC AUTO: 90.6 FL — SIGNIFICANT CHANGE UP (ref 80–94)
NRBC # BLD: 0 /100 WBCS — SIGNIFICANT CHANGE UP (ref 0–0)
PLATELET # BLD AUTO: 256 K/UL — SIGNIFICANT CHANGE UP (ref 130–400)
POTASSIUM SERPL-MCNC: 4.5 MMOL/L — SIGNIFICANT CHANGE UP (ref 3.5–5)
POTASSIUM SERPL-SCNC: 4.5 MMOL/L — SIGNIFICANT CHANGE UP (ref 3.5–5)
PROT SERPL-MCNC: 6 G/DL — SIGNIFICANT CHANGE UP (ref 6–8)
RBC # BLD: 4.24 M/UL — LOW (ref 4.7–6.1)
RBC # FLD: 13.1 % — SIGNIFICANT CHANGE UP (ref 11.5–14.5)
SODIUM SERPL-SCNC: 139 MMOL/L — SIGNIFICANT CHANGE UP (ref 135–146)
WBC # BLD: 4.97 K/UL — SIGNIFICANT CHANGE UP (ref 4.8–10.8)
WBC # FLD AUTO: 4.97 K/UL — SIGNIFICANT CHANGE UP (ref 4.8–10.8)

## 2020-10-03 PROCEDURE — 99238 HOSP IP/OBS DSCHRG MGMT 30/<: CPT

## 2020-10-03 RX ADMIN — SODIUM CHLORIDE 250 MILLILITER(S): 9 INJECTION INTRAMUSCULAR; INTRAVENOUS; SUBCUTANEOUS at 06:01

## 2020-10-03 NOTE — DISCHARGE NOTE NURSING/CASE MANAGEMENT/SOCIAL WORK - PATIENT PORTAL LINK FT
You can access the FollowMyHealth Patient Portal offered by [facility] by registering at the following website: [email or web address]. By joining [facility]’s FollowMyHealth portal, you will also be able to view your health information using other applications (apps) compatible with our system.

## 2020-10-05 LAB — DRUG SCREEN, SERUM: SIGNIFICANT CHANGE UP

## 2020-10-14 DIAGNOSIS — M62.82 RHABDOMYOLYSIS: ICD-10-CM

## 2020-10-14 DIAGNOSIS — R74.01 ELEVATION OF LEVELS OF LIVER TRANSAMINASE LEVELS: ICD-10-CM

## 2021-08-03 ENCOUNTER — RESULT REVIEW (OUTPATIENT)
Age: 21
End: 2021-08-03

## 2021-08-03 ENCOUNTER — OUTPATIENT (OUTPATIENT)
Dept: OUTPATIENT SERVICES | Facility: HOSPITAL | Age: 21
LOS: 1 days | Discharge: HOME | End: 2021-08-03
Payer: SELF-PAY

## 2021-08-03 VITALS
TEMPERATURE: 99 F | WEIGHT: 246.92 LBS | SYSTOLIC BLOOD PRESSURE: 129 MMHG | OXYGEN SATURATION: 100 % | HEART RATE: 69 BPM | RESPIRATION RATE: 18 BRPM | HEIGHT: 77 IN | DIASTOLIC BLOOD PRESSURE: 63 MMHG

## 2021-08-03 VITALS
DIASTOLIC BLOOD PRESSURE: 71 MMHG | HEART RATE: 54 BPM | OXYGEN SATURATION: 100 % | SYSTOLIC BLOOD PRESSURE: 116 MMHG | RESPIRATION RATE: 18 BRPM

## 2021-08-03 DIAGNOSIS — S83.249A OTHER TEAR OF MEDIAL MENISCUS, CURRENT INJURY, UNSPECIFIED KNEE, INITIAL ENCOUNTER: ICD-10-CM

## 2021-08-03 DIAGNOSIS — Z98.890 OTHER SPECIFIED POSTPROCEDURAL STATES: Chronic | ICD-10-CM

## 2021-08-03 PROCEDURE — 88304 TISSUE EXAM BY PATHOLOGIST: CPT | Mod: 26

## 2021-08-03 RX ORDER — OXYCODONE AND ACETAMINOPHEN 5; 325 MG/1; MG/1
1 TABLET ORAL EVERY 4 HOURS
Refills: 0 | Status: DISCONTINUED | OUTPATIENT
Start: 2021-08-03 | End: 2021-08-03

## 2021-08-03 RX ORDER — SODIUM CHLORIDE 9 MG/ML
1000 INJECTION, SOLUTION INTRAVENOUS
Refills: 0 | Status: DISCONTINUED | OUTPATIENT
Start: 2021-08-03 | End: 2021-08-17

## 2021-08-03 RX ORDER — ONDANSETRON 8 MG/1
4 TABLET, FILM COATED ORAL ONCE
Refills: 0 | Status: DISCONTINUED | OUTPATIENT
Start: 2021-08-03 | End: 2021-08-17

## 2021-08-03 RX ORDER — OXYCODONE AND ACETAMINOPHEN 5; 325 MG/1; MG/1
2 TABLET ORAL EVERY 6 HOURS
Refills: 0 | Status: DISCONTINUED | OUTPATIENT
Start: 2021-08-03 | End: 2021-08-03

## 2021-08-03 RX ADMIN — SODIUM CHLORIDE 100 MILLILITER(S): 9 INJECTION, SOLUTION INTRAVENOUS at 12:08

## 2021-08-03 NOTE — ASU DISCHARGE PLAN (ADULT/PEDIATRIC) - ASU DC SPECIAL INSTRUCTIONSFT
Post-Operative Knee Arthroscopy Instructions    Anesthesia:  - No Alcoholic beverages, including beer and wine, for 24 hours or while on prescribed pain medications  - Do not make any important decisions or sign any legal documents  - Do not drive or operate machinery for 24 hours  - You are required upon discharge to leave the surgical center with a responsible adult who will drive you home    Surgery:  - Stay indoors for 2 days  - Continue weight bearing as tolerated - only use crutches for severe pain  - Stairs can be done one step at a time  - Keep clean and dry for 3 days  - Remove dressing in 3 days and cover wounds with band-aids, then you can shower  - If knee is swollen, ice for 10 minutes, 3 times daily while awake  - Ankle range of motion 10 times every hour when awake to both lower extremities for 3 days.  This increases circulation and decreases swelling and decreases the chance for clots  - Take pain medication as prescribed  - Resume regular diet  - Follow-up in approximately 1 week    FOR QUESTIONS OR CONCERNS, CALL [phone]    Notify your doctor if you develop: fever, chills, excessive sweating, drainage, pain not controlled by pain medication      IF AN EMERGENCY ARISES CALL 1 AND/OR GO TO THE EMERGENCY ROOM    DR. WEST  [phone]

## 2021-08-03 NOTE — ASU DISCHARGE PLAN (ADULT/PEDIATRIC) - CARE PROVIDER_API CALL
Claus Pretty  ORTHOPAEDIC SURGERY  [address] [place] [address]  Phone: [phone]  Fax: [phone]  Established Patient  Follow Up Time:

## 2021-08-03 NOTE — CHART NOTE - NSCHARTNOTEFT_GEN_A_CORE
PACU ANESTHESIA ADMISSION NOTE      Procedure: Arthroscopic medial meniscectomy      Post op diagnosis:  Medial meniscus tear        ____  Intubated  TV:______       Rate: ______      FiO2: ______    __x__  Patent Airway    __x__  Full return of protective reflexes    __x__  Full recovery from anesthesia / back to baseline     Vitals:   See Anesthesia record  T- 97.7 P- 58 R-18 B/P- 101/51 SPO2- 100% on RA    Mental Status:  __x__ Awake   _____ Alert   __x___ Drowsy   _____ Sedated    Nausea/Vomiting:  __x__ NO  ______Yes,   See Post - Op Orders          Pain Scale (0-10):  __0___    Treatment: ____ None    ____ See Post - Op/PCA Orders    Post - Operative Fluids:   ____ Oral   __x__ See Post - Op Orders    Plan: Discharge:   __x__Home       _____Floor     _____Critical Care    _____  Other:_________________    Comments: No anesthesia complications/issues noted. Discharge to HOME when criteria met.

## 2021-08-05 DIAGNOSIS — M23.200 DERANGEMENT OF UNSPECIFIED LATERAL MENISCUS DUE TO OLD TEAR OR INJURY, RIGHT KNEE: ICD-10-CM

## 2021-08-05 DIAGNOSIS — M23.203 DERANGEMENT OF UNSPECIFIED MEDIAL MENISCUS DUE TO OLD TEAR OR INJURY, RIGHT KNEE: ICD-10-CM

## 2021-08-05 DIAGNOSIS — M22.41 CHONDROMALACIA PATELLAE, RIGHT KNEE: ICD-10-CM

## 2021-08-09 LAB — SURGICAL PATHOLOGY STUDY: SIGNIFICANT CHANGE UP

## 2022-07-05 ENCOUNTER — APPOINTMENT (OUTPATIENT)
Dept: MRI IMAGING | Facility: CLINIC | Age: 22
End: 2022-07-05

## 2022-07-05 PROBLEM — Z00.00 ENCOUNTER FOR PREVENTIVE HEALTH EXAMINATION: Status: ACTIVE | Noted: 2022-07-05

## 2022-07-05 PROCEDURE — 73721 MRI JNT OF LWR EXTRE W/O DYE: CPT

## 2022-07-06 DIAGNOSIS — M25.562 PAIN IN LEFT KNEE: ICD-10-CM

## 2022-07-14 ENCOUNTER — APPOINTMENT (OUTPATIENT)
Dept: ORTHOPEDIC SURGERY | Facility: CLINIC | Age: 22
End: 2022-07-14

## 2022-07-18 ENCOUNTER — APPOINTMENT (OUTPATIENT)
Dept: ORTHOPEDIC SURGERY | Facility: CLINIC | Age: 22
End: 2022-07-18

## 2022-07-18 DIAGNOSIS — S83.419A SPRAIN OF MEDIAL COLLATERAL LIGAMENT OF UNSPECIFIED KNEE, INITIAL ENCOUNTER: ICD-10-CM

## 2022-07-18 PROCEDURE — 99213 OFFICE O/P EST LOW 20 MIN: CPT

## 2022-07-18 NOTE — PHYSICAL EXAM
[FreeTextEntry3] :  left knee has no effusion does open up slightly medially at 30° flexion stable in full extension negative Lachman's negative posterior draw

## 2022-07-18 NOTE — REASON FOR VISIT
[FreeTextEntry2] : Left knee injury playing football 3 weeks ago comes in with her  today from [facility]

## 2022-07-18 NOTE — ASSESSMENT
[FreeTextEntry1] :  recommend a custom derotational functional brace to wear on his left knee while playing football for the season as well as for the next 3 weeks full time will opened up to full range of motion on the brace, for his diagnosis of partial tear of the MCL left knee, custom brace is needed due to the enlargement and extreme hypertrophy of the quadriceps hamstring muscles

## 2022-09-26 RX ORDER — MELOXICAM 15 MG/1
15 TABLET ORAL
Qty: 30 | Refills: 0 | Status: ACTIVE | COMMUNITY
Start: 2022-09-26 | End: 1900-01-01

## 2022-09-28 ENCOUNTER — APPOINTMENT (OUTPATIENT)
Dept: ORTHOPEDIC SURGERY | Facility: CLINIC | Age: 22
End: 2022-09-28

## 2022-09-28 DIAGNOSIS — S86.919A STRAIN OF UNSPECIFIED MUSCLE(S) AND TENDON(S) AT LOWER LEG LEVEL, UNSPECIFIED LEG, INITIAL ENCOUNTER: ICD-10-CM

## 2022-09-28 PROCEDURE — 99214 OFFICE O/P EST MOD 30 MIN: CPT | Mod: 25

## 2022-09-28 PROCEDURE — 20611 DRAIN/INJ JOINT/BURSA W/US: CPT

## 2022-09-28 NOTE — HISTORY OF PRESENT ILLNESS
[de-identified] : Patient is here for evaluation of his right knee he had banged several times playing football is now swollen he was seen with the  he has got a large effusion but he stable good range of motion he has got small abrasion laterally on the right knee approximately 4 x 2 cm from a turf burn is healing well in no signs of infection\par \par  due to the large effusion recommend an aspiration at 65 cc of blood was drained under sterile conditions using ultrasound guidance he tolerated well and also Ace bandage did tell ice it will be out of practice for the next 2 days about her be able to play this weekend for the right knee contusion and drainage of a hematoma

## 2022-10-31 ENCOUNTER — APPOINTMENT (OUTPATIENT)
Dept: ORTHOPEDIC SURGERY | Facility: CLINIC | Age: 22
End: 2022-10-31
Payer: COMMERCIAL

## 2022-10-31 ENCOUNTER — RESULT CHARGE (OUTPATIENT)
Age: 22
End: 2022-10-31

## 2022-10-31 VITALS — WEIGHT: 250 LBS | HEIGHT: 76 IN | BODY MASS INDEX: 30.44 KG/M2

## 2022-10-31 DIAGNOSIS — S60.222A CONTUSION OF LEFT HAND, INITIAL ENCOUNTER: ICD-10-CM

## 2022-10-31 PROCEDURE — 99214 OFFICE O/P EST MOD 30 MIN: CPT

## 2022-10-31 NOTE — HISTORY OF PRESENT ILLNESS
[de-identified] : 22-year-old male had a contusion to his left hand sustained while playing football injury occurred couple days ago he had pain discomfort but he is able to place with the rest of the game not sure exactly when it happened in terms of the game it did happen 2 days ago

## 2022-10-31 NOTE — ASSESSMENT
[FreeTextEntry1] :   Patient left hand contusion.  No further treatments are necessary.  Id he will do ice and treatment with the trainers.  If he has persistent pain MRI would be indicated

## 2022-10-31 NOTE — DATA REVIEWED
[FreeTextEntry1] :  Radiographs three views of the left hand reviewed showing no fracture dislocation.

## 2022-10-31 NOTE — PHYSICAL EXAM
[de-identified] :  Patient has swelling across the dorsal aspect of his left hand has no rotational abnormality he has normal sensation normal capillary refill he has tenderness to palpation at the base of the index metacarpal no pain along the thumb no pain in the snuffbox of the wrist Include Z78.9 (Other Specified Conditions Influencing Health Status) As An Associated Diagnosis?: No

## 2023-01-11 NOTE — ASU PREOP CHECKLIST - NS PREOP CHK CHLOROHEX WASH
Patient's inquiring about 2 medications that were suppose to be refilled: Bisacodyl 5 mg. EC tablet & Duoneb 0.5-2.5 mg/3ML nebulizer solution yesterday!  [facility] @ [phone]  Please advise!  
Spoke to patients pharmacy. Prescriptions were never received. New prescriptions were sent, pharmacy confirms they received both.    Patient updated on the above. Encouraged to call our office with additional questions/concerns.  
N/A

## 2023-05-30 NOTE — ASU PREOP CHECKLIST - PATIENT SENT TO
· Residual left hemiplegia and dysphagia, G-Tube  · Continue  PT/OT for strength training as recommended  · Maintain fall/safety precautions  · Continue to assist with ADL's as needed  · Continue plavix 75 mg daily  · Continue ASA 81 mg daily  · Continue Lipitor 40 mg daily operating room

## 2024-03-29 ENCOUNTER — NON-APPOINTMENT (OUTPATIENT)
Age: 24
End: 2024-03-29

## 2024-03-29 NOTE — PATIENT PROFILE ADULT - NSPROSPHOSPCHAPLAINYN_GEN_A_NUR
Our office received a fax stating that due to insurance reasons, order for diabetic shoe/inserts must be signed by MD or DO.   Dr. Márquez is no longer seeing patients/ signing orders, Farhana has also PRN'd patient at last visit on 8/23/23. I explained this to &[facility] and suggested they contact his PCP.    no